# Patient Record
Sex: MALE | Race: BLACK OR AFRICAN AMERICAN | NOT HISPANIC OR LATINO | URBAN - METROPOLITAN AREA
[De-identification: names, ages, dates, MRNs, and addresses within clinical notes are randomized per-mention and may not be internally consistent; named-entity substitution may affect disease eponyms.]

---

## 2017-11-27 ENCOUNTER — EMERGENCY (EMERGENCY)
Facility: HOSPITAL | Age: 47
LOS: 1 days | Discharge: HOME | End: 2017-11-27
Attending: EMERGENCY MEDICINE | Admitting: EMERGENCY MEDICINE
Payer: COMMERCIAL

## 2017-11-27 VITALS
DIASTOLIC BLOOD PRESSURE: 122 MMHG | SYSTOLIC BLOOD PRESSURE: 166 MMHG | OXYGEN SATURATION: 100 % | HEART RATE: 98 BPM | RESPIRATION RATE: 18 BRPM | TEMPERATURE: 98 F

## 2017-11-27 LAB
APPEARANCE UR: CLEAR — SIGNIFICANT CHANGE UP
BASE EXCESS BLDV CALC-SCNC: 8.4 MMOL/L — SIGNIFICANT CHANGE UP
BILIRUB UR-MCNC: NEGATIVE — SIGNIFICANT CHANGE UP
BLD GP AB SCN SERPL QL: NEGATIVE — SIGNIFICANT CHANGE UP
BLOOD GAS VENOUS - CREATININE: SIGNIFICANT CHANGE UP MG/DL (ref 0.5–1.3)
BLOOD UR QL VISUAL: NEGATIVE — SIGNIFICANT CHANGE UP
CHLORIDE BLDV-SCNC: 101 MMOL/L — SIGNIFICANT CHANGE UP (ref 96–108)
COLOR SPEC: YELLOW — SIGNIFICANT CHANGE UP
GAS PNL BLDV: 139 MMOL/L — SIGNIFICANT CHANGE UP (ref 136–146)
GLUCOSE BLDV-MCNC: 91 — SIGNIFICANT CHANGE UP (ref 70–99)
GLUCOSE UR-MCNC: NEGATIVE — SIGNIFICANT CHANGE UP
HCO3 BLDV-SCNC: 29 MMOL/L — HIGH (ref 20–27)
HCT VFR BLDV CALC: 44.8 % — SIGNIFICANT CHANGE UP (ref 39–51)
HGB BLDV-MCNC: 14.6 G/DL — SIGNIFICANT CHANGE UP (ref 13–17)
KETONES UR-MCNC: NEGATIVE — SIGNIFICANT CHANGE UP
LACTATE BLDV-MCNC: 1.1 MMOL/L — SIGNIFICANT CHANGE UP (ref 0.5–2)
LEUKOCYTE ESTERASE UR-ACNC: HIGH
MUCOUS THREADS # UR AUTO: SIGNIFICANT CHANGE UP
NITRITE UR-MCNC: NEGATIVE — SIGNIFICANT CHANGE UP
PCO2 BLDV: 62 MMHG — HIGH (ref 41–51)
PH BLDV: 7.36 PH — SIGNIFICANT CHANGE UP (ref 7.32–7.43)
PH UR: 8 — SIGNIFICANT CHANGE UP (ref 4.6–8)
PO2 BLDV: 22 MMHG — LOW (ref 35–40)
POTASSIUM BLDV-SCNC: 4.6 MMOL/L — HIGH (ref 3.4–4.5)
PROT UR-MCNC: 100 — SIGNIFICANT CHANGE UP
RBC CASTS # UR COMP ASSIST: SIGNIFICANT CHANGE UP (ref 0–?)
RH IG SCN BLD-IMP: POSITIVE — SIGNIFICANT CHANGE UP
SAO2 % BLDV: 31.7 % — LOW (ref 60–85)
SP GR SPEC: 1.02 — SIGNIFICANT CHANGE UP (ref 1–1.03)
UROBILINOGEN FLD QL: NORMAL E.U. — SIGNIFICANT CHANGE UP (ref 0.1–0.2)
WBC UR QL: SIGNIFICANT CHANGE UP (ref 0–?)

## 2017-11-27 PROCEDURE — 76870 US EXAM SCROTUM: CPT | Mod: 26

## 2017-11-27 PROCEDURE — 99284 EMERGENCY DEPT VISIT MOD MDM: CPT

## 2017-11-27 RX ORDER — IBUPROFEN 200 MG
600 TABLET ORAL ONCE
Qty: 0 | Refills: 0 | Status: COMPLETED | OUTPATIENT
Start: 2017-11-27 | End: 2017-11-27

## 2017-11-27 RX ORDER — CEFTRIAXONE 500 MG/1
250 INJECTION, POWDER, FOR SOLUTION INTRAMUSCULAR; INTRAVENOUS ONCE
Qty: 0 | Refills: 0 | Status: COMPLETED | OUTPATIENT
Start: 2017-11-27 | End: 2017-11-27

## 2017-11-27 RX ADMIN — Medication 600 MILLIGRAM(S): at 15:11

## 2017-11-27 RX ADMIN — Medication 100 MILLIGRAM(S): at 15:11

## 2017-11-27 RX ADMIN — CEFTRIAXONE 250 MILLIGRAM(S): 500 INJECTION, POWDER, FOR SOLUTION INTRAMUSCULAR; INTRAVENOUS at 15:11

## 2017-11-27 NOTE — ED PROVIDER NOTE - GENITOURINARY BLADDER
Left testicle enlarged, firm, soft fullness in left scrotum.  Tender left testicle, nontender epididymus.  unable to elicit cremaster reflex bilateral.  Mildly tender left inguinal region.

## 2017-11-27 NOTE — ED ADULT TRIAGE NOTE - CHIEF COMPLAINT QUOTE
Co left testicular pain and swelling since Wednesday, states pain is becoming progressively worse. Denies n/v/d, dysuria. /125, denies hx of htn. PMH: glaucoma

## 2017-11-27 NOTE — ED PROVIDER NOTE - OBJECTIVE STATEMENT
47 y.o. previously healthy male p/w progressively worsening left testicular pain and swelling x 5 days.  No trauma, no abdominal pain, some bladder discomfort with urination occasionally.  No urethral discharge or hematuria, no horse riding or other saddle injury.

## 2017-11-27 NOTE — ED ADULT NURSE NOTE - OBJECTIVE STATEMENT
patient alert ozx3 came in c/o testicular pain. denies n/v/. not in any distress. labs done as ordered. awaiting results and re eval.

## 2017-11-27 NOTE — ED PROVIDER NOTE - ATTENDING CONTRIBUTION TO CARE
I, Jennifer Cabot, MD, have performed a history and physical exam of the patient and discussed their management with the resident. I reviewed the resident's note and agree with the documented findings and plan of care. My medical decision making and observations are found above.    Cabot: 47M with no PMH who comes in with days of vague L testicular pain and edema.  + mild dysuria.  No hematuria or penile discharge, no F/C.  No flank pain.  No trauma.  No prior episodes.  Is sexually active and does not use protection.  On exam, HDS, looks well, NAD, abd soft, NT, ND, no CVAT, penis uncircumcised, no discharge, L testicle much larger than the R, mildly TTP, not warm or erythematous, + enlarged mass next to testicle, not reducible.  DDx includes epididymitis, orchitis, less likely hernia, very unlikely torsion.  Will check UA, testicular US.  If neg, will check CT A/P.

## 2017-11-27 NOTE — ED PROVIDER NOTE - MEDICAL DECISION MAKING DETAILS
Inguinal hernia vs. orchitis vs. hydrocele vs. testicular torsion. Inguinal hernia vs. orchitis vs. hydrocele vs. testicular torsion.    Cabot: 47M with no PMH who comes in with days of vague L testicular pain and edema.  + mild dysuria.  No hematuria or penile discharge, no F/C.  No flank pain.  No trauma.  No prior episodes.  Is sexually active and does not use protection.  On exam, HDS, looks well, NAD, abd soft, NT, ND, no CVAT, penis uncircumcised, no discharge, L testicle much larger than the R, mildly TTP, not warm or erythematous, + enlarged mass next to testicle, not reducible.  DDx includes epididymitis, orchitis, less likely hernia, very unlikely torsion.  Will check UA, testicular US.  If neg, will check CT A/P.

## 2017-11-28 LAB
C TRACH RRNA SPEC QL NAA+PROBE: SIGNIFICANT CHANGE UP
N GONORRHOEA RRNA SPEC QL NAA+PROBE: SIGNIFICANT CHANGE UP
SPECIMEN SOURCE: SIGNIFICANT CHANGE UP
SPECIMEN SOURCE: SIGNIFICANT CHANGE UP

## 2017-11-29 LAB — BACTERIA UR CULT: SIGNIFICANT CHANGE UP

## 2021-08-02 NOTE — ED PROVIDER NOTE - NS ED MD DISPO DISCHARGE CCDA
Adequate: hears normal conversation without difficulty Patient/Caregiver provided printed discharge information.